# Patient Record
Sex: FEMALE | Race: BLACK OR AFRICAN AMERICAN | URBAN - METROPOLITAN AREA
[De-identification: names, ages, dates, MRNs, and addresses within clinical notes are randomized per-mention and may not be internally consistent; named-entity substitution may affect disease eponyms.]

---

## 2020-06-03 ENCOUNTER — OFFICE VISIT (OUTPATIENT)
Dept: URBAN - METROPOLITAN AREA CLINIC 97 | Facility: CLINIC | Age: 35
End: 2020-06-03
Payer: COMMERCIAL

## 2020-06-03 VITALS
TEMPERATURE: 97.4 F | WEIGHT: 170 LBS | SYSTOLIC BLOOD PRESSURE: 103 MMHG | HEART RATE: 77 BPM | DIASTOLIC BLOOD PRESSURE: 70 MMHG | HEIGHT: 65 IN | RESPIRATION RATE: 18 BRPM | BODY MASS INDEX: 28.32 KG/M2

## 2020-06-03 DIAGNOSIS — D50.8 OTHER IRON DEFICIENCY ANEMIA: ICD-10-CM

## 2020-06-03 PROCEDURE — 96365 THER/PROPH/DIAG IV INF INIT: CPT | Performed by: INTERNAL MEDICINE

## 2020-06-03 RX ORDER — SULFASALAZINE 500 MG/1
TAKE 9 TABLETS BY ORAL ROUTE DAILY FOR 90 DAYS TABLET ORAL 1
Qty: 810 | Refills: 4 | Status: ACTIVE | COMMUNITY
Start: 2019-12-09 | End: 2021-03-03

## 2020-06-24 ENCOUNTER — LAB OUTSIDE AN ENCOUNTER (OUTPATIENT)
Dept: URBAN - METROPOLITAN AREA TELEHEALTH 2 | Facility: TELEHEALTH | Age: 35
End: 2020-06-24

## 2020-06-24 ENCOUNTER — OFFICE VISIT (OUTPATIENT)
Dept: URBAN - METROPOLITAN AREA TELEHEALTH 2 | Facility: TELEHEALTH | Age: 35
End: 2020-06-24
Payer: COMMERCIAL

## 2020-06-24 DIAGNOSIS — Z12.11 COLON CANCER SCREENING: ICD-10-CM

## 2020-06-24 DIAGNOSIS — K92.1 BLOOD IN STOOL: ICD-10-CM

## 2020-06-24 DIAGNOSIS — R74.8 ABNORMAL LEVELS OF OTHER SERUM ENZYMES: ICD-10-CM

## 2020-06-24 DIAGNOSIS — K51.00 ULCERATIVE COLITIS, UNIVERSAL, WITHOUT COMPLICATIONS: ICD-10-CM

## 2020-06-24 PROCEDURE — 99214 OFFICE O/P EST MOD 30 MIN: CPT | Performed by: INTERNAL MEDICINE

## 2020-06-24 PROCEDURE — G9903 PT SCRN TBCO ID AS NON USER: HCPCS | Performed by: INTERNAL MEDICINE

## 2020-06-24 PROCEDURE — G8427 DOCREV CUR MEDS BY ELIG CLIN: HCPCS | Performed by: INTERNAL MEDICINE

## 2020-06-24 PROCEDURE — G9622 NO UNHEAL ETOH USER: HCPCS | Performed by: INTERNAL MEDICINE

## 2020-06-24 PROCEDURE — 3017F COLORECTAL CA SCREEN DOC REV: CPT | Performed by: INTERNAL MEDICINE

## 2020-06-24 PROCEDURE — G8420 CALC BMI NORM PARAMETERS: HCPCS | Performed by: INTERNAL MEDICINE

## 2020-06-24 PROCEDURE — G8482 FLU IMMUNIZE ORDER/ADMIN: HCPCS | Performed by: INTERNAL MEDICINE

## 2020-06-24 PROCEDURE — 1036F TOBACCO NON-USER: CPT | Performed by: INTERNAL MEDICINE

## 2020-06-24 RX ORDER — SULFASALAZINE 500 MG/1
TAKE 9 TABLETS BY ORAL ROUTE DAILY FOR 90 DAYS TABLET ORAL 1
OUTPATIENT
Start: 2019-12-09 | End: 2021-03-03

## 2020-06-24 RX ORDER — SODIUM, POTASSIUM,MAG SULFATES 17.5-3.13G
177 ML SOLUTION, RECONSTITUTED, ORAL ORAL ONCE
Qty: 1 | Refills: 0 | OUTPATIENT

## 2020-06-24 RX ORDER — SULFASALAZINE 500 MG/1
TAKE 9 TABLETS BY ORAL ROUTE DAILY FOR 90 DAYS TABLET ORAL 1
Qty: 810 | Refills: 4 | Status: ACTIVE | COMMUNITY
Start: 2019-12-09 | End: 2021-03-03

## 2020-06-24 NOTE — HPI-TODAY'S VISIT:
PT is a 35 y/o individual with h/o left sided UC, currently on sulfasalazine (6 tab), here for follow-up of her condition.  Patient is referred by Dr. Jenifer Diaz.  She was diagnosed with UC in 3/2017 with sxs of consistent diarrhea.  She was started on antibiotics, which did not work.  Stool studies were negative.  She had a colonoscopy and was found to have mild colitis.  Previously on 11/1/2018, she reports that she has variable BM s.  She sees blood several times per week.  She reports having anywhere from 2-3 BM s per day, loose in nature.  No abdominal pain. No urgency.  A colonoscopy with Dr Tubbs on 11/2018 revealed Lundberg Score 2 disease in distal colon, confirmed on biopsies with active moderate disease and mild disease proximally.  Previously on 11/14/2018, she is noticing some nausea after she got to about 8 tablets of sulfasalazine. She has seen almost complete reduction in blood, still loose, but starting to get formed. Some abdominal pain, but perhaps jitters prior to her trip to [place].  Previously on 2/8/2019, she reports that she feels that she is almost normal.  She has up to 3 BM, formed in nature, no blood in stool.  No abdominal pain.  Her trip to [place] was good and did not get pregnant.  She had flex sig 5/2019 which revealed normal colonoscopy, Lundberg Score 0 to 1 in rectum, with biopsies confirming no active disease.  Previously on 6/20/2019, she has up to 2BM per day, no blood in the stool.  She has occasional abdominal pain/cramping.  Some fatigue.  Previously on 8/8/2019, she reports that is pregnant.  She has up to 2 BM per day, slightly loose and green in color, sometimes in pebble appearance.  Has started on MVI by gynecologist.  She has some fatigue.  Signif nausea.  Previously on 9/23/2019 she reports that she has 1 BM 2 times per week.  No blood in the stool.  No abdominal pain.  Pregnancy is going well.  Has a lot of nausea and vomiting.   Previously on 10/23/2019, pt reports that she has been doing well.  N/V has resolved, nl BM.  Pregnancy is going well overall.  Has hemorrhoids that have become enlarged and swollen, no pain.  Having constipation, has not started miralax.  Saw blood in the stool.  Previously on 1/13/2020, pt reports that she is at 28 weeks of pregnancy.  Baby is doing well.  She has some issues with constipation every now and then, improves with Metamucil.  Nausea and vomiting has persists.  She was given Zofran by OB doctor.  She is taking her medications.  She is losing some weight and is recommended to take some ensure supplementation. . Previously on 3/2/2020, pt reports she had to deliver her baby 6 weeks early (32 weeks) due to poor growth of the baby.  She does report that the pregnancy was stressful for her and had a lot of nausea and heartburn and associated nausea and vomiting. She had poor weight gain as well.  Has 1 BM per day, no blood.  Does have some constipation. . Today on 6/24/2020, pt reports that she is doing well.  She got the iron infusion, 2 infusion.  Was found to have abnl lft with PCP.  She has occasional spots of blood and occasional mucus.  She sees this initially when the dose of sulfa was reduced from 9 to 6.  . No NSAIDs. . No joint pain, no rashes.  Weight fluctuates.  No eye pain or eye sxs.   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 Labs: OSH 6/2020: Ast 51, alt 61 1/2020: hgb 10.6, ast 14, alt 10 6/2019: Hgb 10.9, Ast 18, Alt 13, ESR 13 2/2019: Hgb 10.8, Ast 20, Alt 13, ESR 7 11/2018: Hgb: 12.7, Ast 15, Alt 10, Iron Sat 20, Ferritin 108, Vit D 18.3, Esr 44, B12 813; Crp 4.8, Hep B immune; TB-Gold neg  10/2018: Hgb 12.7, Ast 15, Alt 10, Iron Sat 20%, Ferritin 108, vit D 18.3, Esr 44, Crp 4.8 . Quant gold neg, Hep B immune . 3/2017:[facility] serology c/w UC . Colonoscopy 5/2019: The sigmoid colon and descending colon appeared normal.  Biopsies were taken with a cold forceps for histology.  The pathology specimen was placed into Bottle Number 1.  A diffuse area of mildly erythematous mucosa was found in the rectum.  This was biopsied with a cold forceps for histology. The pathology specimen was placed into Bottle Number 2.  A. Sigmoid Colon , Biopsy: -Colonic mucosa with no diagnostic abnormality.  -No evidence of chronic or active colitis.  -No granulomas seen.  -Negative for dysplasia or malignancy. B. Rectum , Biopsy: -Minimal architectural changes (see comment). -No evidence of active colitis. -No granulomas seen. -Negative for dysplasia or malignancy.   11/2018 with Dr. Tubbs Inflammation was found in a continuous and circumferential pattern from the rectum to the sigmoid colon.  This was graded as Lundberg Score 2 (moderate, with marked erythema, absent vascular pattern, friability, erosions).  Biopsies were taken with a cold forceps for histology.  The pathology specimen was placed into Bottle Number 3. A localized area of mildly erythematous mucosa was found in the ascending colon.  The pathology specimen was placed into Bottle Number 1.  A. Right Colon , Biopsy: -Architectural and inflammatory changes consistent with primary inflammatory bowel disease with minimal activity. -No evidence of dysplasia or malignancy. B. Random Colon , Biopsy: -No diagnostic abnormality. -No evidence of chronic or active colitis, including lymphocytic and collagenous colitis. -No evidence of dysplasia or malignancy. C. Rectum , Biopsy: -Architectural and inflammatory changes consistent with primary inflammatory bowel disease with moderate activity. -No evidence of dysplasia or malignancy.   2017: The terminal ileum appeared normal.  Biopsies were taken with a cold forceps for histology. A patchy area of moderately congested, erythematous, friable (with contact bleeding), granular, hemorrhagic, inflamed and ulcerated mucosa was found in the recto-sigmoid colon, in the sigmoid colon and in the cecum.  Biopsies were taken with a cold forceps for histology from cecum and rectosigmoid colon. The descending colon, transverse colon and ascending colon appeared normal.  Biopsies were taken with a cold forceps for histology and labeled right colon, left colon.  A. TERMINAL ILEUM (BIOPSY) ILEAL MUCOSA WITH NO SIGNIFICANT HISTOPATHOLOGIC CHANGES. Negative for ileitis. B. CECUM (BIOPSY) CHRONIC ACTIVE COLITIS. THESE CHANGES MAY BE SEEN WITH INFLAMMATORY BOWEL DISEASE OR A  PROLONGED SELF LIMITED COLITIS. Negative for granulomas and dysplasia. C. COLON (BIOPSY), RIGHT COLONIC MUCOSA WITH NO SIGNIFICANT HISTOPATHOLOGIC CHANGES. Negative for microscopic colitis. D. COLON (BIOPSY), LEFT COLONIC MUCOSA WITH NO SIGNIFICANT HISTOPATHOLOGIC CHANGES. Negative for microscopic colitis. E. RECTOSIGMOID (BIOPSY) CHRONIC ACTIVE COLITIS. THESE CHANGES MAY BE SEEN WITH INFLAMMATORY BOWEL DISEASE OR A  PROLONGED SELF LIMITED COLITIS. Negative for granulomas and dysplasia.

## 2020-07-13 ENCOUNTER — OFFICE VISIT (OUTPATIENT)
Dept: URBAN - METROPOLITAN AREA CLINIC 98 | Facility: CLINIC | Age: 35
End: 2020-07-13

## 2020-07-14 ENCOUNTER — TELEPHONE ENCOUNTER (OUTPATIENT)
Dept: URBAN - METROPOLITAN AREA CLINIC 92 | Facility: CLINIC | Age: 35
End: 2020-07-14

## 2020-07-14 RX ORDER — SODIUM, POTASSIUM,MAG SULFATES 17.5-3.13G
177 ML SOLUTION, RECONSTITUTED, ORAL ORAL ONCE
Qty: 1 | Refills: 0 | Status: ACTIVE | COMMUNITY

## 2020-07-14 RX ORDER — SULFASALAZINE 500 MG/1
TAKE 9 TABLETS BY ORAL ROUTE DAILY FOR 90 DAYS TABLET ORAL 1
Status: ACTIVE | COMMUNITY
Start: 2019-12-09 | End: 2021-03-03

## 2020-07-14 RX ORDER — SODIUM, POTASSIUM,MAG SULFATES 17.5-3.13G
TAKE 177 ML SOLUTION, RECONSTITUTED, ORAL ORAL
Qty: 177 ML | Refills: 0 | OUTPATIENT
Start: 2020-07-14 | End: 2020-07-15

## 2020-08-06 ENCOUNTER — OFFICE VISIT (OUTPATIENT)
Dept: URBAN - METROPOLITAN AREA SURGERY CENTER 18 | Facility: SURGERY CENTER | Age: 35
End: 2020-08-06
Payer: COMMERCIAL

## 2020-08-06 DIAGNOSIS — K63.5 BENIGN COLON POLYP: ICD-10-CM

## 2020-08-06 DIAGNOSIS — K51.00 CHRONIC PANCOLONIC ULCERATIVE COLITIS: ICD-10-CM

## 2020-08-06 PROCEDURE — 45380 COLONOSCOPY AND BIOPSY: CPT | Performed by: INTERNAL MEDICINE

## 2020-08-06 PROCEDURE — G9937 DIG OR SURV COLSCO: HCPCS | Performed by: INTERNAL MEDICINE

## 2020-08-06 PROCEDURE — G8907 PT DOC NO EVENTS ON DISCHARG: HCPCS | Performed by: INTERNAL MEDICINE

## 2020-08-06 RX ORDER — SODIUM, POTASSIUM,MAG SULFATES 17.5-3.13G
177 ML SOLUTION, RECONSTITUTED, ORAL ORAL ONCE
Qty: 1 | Refills: 0 | Status: ACTIVE | COMMUNITY

## 2020-08-06 RX ORDER — SULFASALAZINE 500 MG/1
TAKE 9 TABLETS BY ORAL ROUTE DAILY FOR 90 DAYS TABLET ORAL 1
Status: ACTIVE | COMMUNITY
Start: 2019-12-09 | End: 2021-03-03

## 2020-08-07 ENCOUNTER — TELEPHONE ENCOUNTER (OUTPATIENT)
Dept: URBAN - METROPOLITAN AREA CLINIC 96 | Facility: CLINIC | Age: 35
End: 2020-08-07

## 2020-08-17 ENCOUNTER — OFFICE VISIT (OUTPATIENT)
Dept: URBAN - METROPOLITAN AREA CLINIC 98 | Facility: CLINIC | Age: 35
End: 2020-08-17
Payer: COMMERCIAL

## 2020-08-17 DIAGNOSIS — R74.8 ABNORMAL AST AND ALT: ICD-10-CM

## 2020-08-17 DIAGNOSIS — K51.919 CHRONIC ULCERATIVE COLITIS: ICD-10-CM

## 2020-08-17 PROCEDURE — 86376 MICROSOMAL ANTIBODY EACH: CPT | Performed by: INTERNAL MEDICINE

## 2020-08-17 PROCEDURE — G8420 CALC BMI NORM PARAMETERS: HCPCS | Performed by: INTERNAL MEDICINE

## 2020-08-17 PROCEDURE — G9903 PT SCRN TBCO ID AS NON USER: HCPCS | Performed by: INTERNAL MEDICINE

## 2020-08-17 PROCEDURE — 99214 OFFICE O/P EST MOD 30 MIN: CPT | Performed by: INTERNAL MEDICINE

## 2020-08-17 PROCEDURE — G8427 DOCREV CUR MEDS BY ELIG CLIN: HCPCS | Performed by: INTERNAL MEDICINE

## 2020-08-17 RX ORDER — SULFASALAZINE 500 MG/1
TAKE 9 TABLETS BY ORAL ROUTE DAILY FOR 90 DAYS TABLET ORAL 1
Status: ACTIVE | COMMUNITY
Start: 2019-12-09 | End: 2021-03-03

## 2020-08-17 RX ORDER — PRENATAL VIT 49/IRON FUM/FOLIC 6.75-0.2MG
AS DIRECTED TABLET ORAL
Status: ACTIVE | COMMUNITY

## 2020-08-17 RX ORDER — SODIUM, POTASSIUM,MAG SULFATES 17.5-3.13G
177 ML SOLUTION, RECONSTITUTED, ORAL ORAL ONCE
Qty: 1 | Refills: 0 | Status: ON HOLD | COMMUNITY

## 2020-08-17 RX ORDER — CHOLECALCIFEROL (VITAMIN D3) 50 MCG
1 TABLET TABLET ORAL ONCE A DAY
Status: ACTIVE | COMMUNITY

## 2020-08-17 NOTE — HPI-TODAY'S VISIT:
Here on referral for elevated liver tests.  Recently found on labs  sees Dr Tubbs for UC  @ pregnancy - lost 20 lbs during pregnancy  threw up all through pregnancy  fetal placenta w poor blood flow - he was always small  . had baby (premie) 2/21  had iron infusions may (early); 6/3 . labs 6/2020: alt 61 ast 51 alp 115  7/2020 alt 63 ast 45 alp 128 tbili 0.2 . now back at normal weight  breastfeeding which is going well . vit D x 1 month bought online - started after liver tests started rising collagen : started after baby

## 2020-08-24 ENCOUNTER — OFFICE VISIT (OUTPATIENT)
Dept: URBAN - METROPOLITAN AREA TELEHEALTH 2 | Facility: TELEHEALTH | Age: 35
End: 2020-08-24
Payer: COMMERCIAL

## 2020-08-24 DIAGNOSIS — Z09 FOLLOW UP: ICD-10-CM

## 2020-08-24 DIAGNOSIS — K92.1 BLOOD IN STOOL: ICD-10-CM

## 2020-08-24 DIAGNOSIS — R94.5 ABNORMAL LFTS: ICD-10-CM

## 2020-08-24 DIAGNOSIS — K51.00 ULCERATIVE COLITIS, UNIVERSAL, WITHOUT COMPLICATIONS: ICD-10-CM

## 2020-08-24 PROCEDURE — G9903 PT SCRN TBCO ID AS NON USER: HCPCS | Performed by: INTERNAL MEDICINE

## 2020-08-24 PROCEDURE — G9622 NO UNHEAL ETOH USER: HCPCS | Performed by: INTERNAL MEDICINE

## 2020-08-24 PROCEDURE — G8420 CALC BMI NORM PARAMETERS: HCPCS | Performed by: INTERNAL MEDICINE

## 2020-08-24 PROCEDURE — 1036F TOBACCO NON-USER: CPT | Performed by: INTERNAL MEDICINE

## 2020-08-24 PROCEDURE — 99213 OFFICE O/P EST LOW 20 MIN: CPT | Performed by: INTERNAL MEDICINE

## 2020-08-24 PROCEDURE — G8427 DOCREV CUR MEDS BY ELIG CLIN: HCPCS | Performed by: INTERNAL MEDICINE

## 2020-08-24 PROCEDURE — 3017F COLORECTAL CA SCREEN DOC REV: CPT | Performed by: INTERNAL MEDICINE

## 2020-08-24 PROCEDURE — G8482 FLU IMMUNIZE ORDER/ADMIN: HCPCS | Performed by: INTERNAL MEDICINE

## 2020-08-24 RX ORDER — PRENATAL VIT 49/IRON FUM/FOLIC 6.75-0.2MG
AS DIRECTED TABLET ORAL
Status: ACTIVE | COMMUNITY

## 2020-08-24 RX ORDER — SODIUM, POTASSIUM,MAG SULFATES 17.5-3.13G
177 ML SOLUTION, RECONSTITUTED, ORAL ORAL ONCE
Qty: 1 | Refills: 0 | OUTPATIENT

## 2020-08-24 RX ORDER — SULFASALAZINE 500 MG/1
TAKE 9 TABLETS BY ORAL ROUTE DAILY FOR 90 DAYS TABLET ORAL 1
Status: ACTIVE | COMMUNITY
Start: 2019-12-09 | End: 2021-03-03

## 2020-08-24 RX ORDER — SODIUM, POTASSIUM,MAG SULFATES 17.5-3.13G
177 ML SOLUTION, RECONSTITUTED, ORAL ORAL ONCE
Qty: 1 | Refills: 0 | Status: ON HOLD | COMMUNITY

## 2020-08-24 RX ORDER — CHOLECALCIFEROL (VITAMIN D3) 50 MCG
1 TABLET TABLET ORAL ONCE A DAY
Status: ACTIVE | COMMUNITY

## 2020-08-24 NOTE — HPI-TODAY'S VISIT:
Ms Mackey is a 33 y/o individual with h/o left sided UC, currently on sulfasalazine (9 tab), here for follow-up of her condition. . Patient is referred by Dr. Jenifer Diaz. . She was diagnosed with UC in 3/2017 with sxs of consistent diarrhea.  She was started on antibiotics, which did not work.  Stool studies were negative.  She had a colonoscopy and was found to have mild colitis. . Previously on 11/1/2018, she reports that she has variable BM s.  She sees blood several times per week.  She reports having anywhere from 2-3 BM s per day, loose in nature.  No abdominal pain. No urgency. . A colonoscopy with Dr Tubbs on 11/2018 revealed Lundberg Score 2 disease in distal colon, confirmed on biopsies with active moderate disease and mild disease proximally. . Previously on 11/14/2018, she is noticing some nausea after she got to about 8 tablets of sulfasalazine. She has seen almost complete reduction in blood, still loose, but starting to get formed. Some abdominal pain, but perhaps jitters prior to her trip to [place]. . Previously on 2/8/2019, she reports that she feels that she is almost normal.  She has up to 3 BM, formed in nature, no blood in stool.  No abdominal pain.  Her trip to [facility] was good and did not get pregnant. . She had flex sig 5/2019 which revealed normal colonoscopy, Lundberg Score 0 to 1 in rectum, with biopsies confirming no active disease. . Previously on 6/20/2019, she has up to 2BM per day, no blood in the stool.  She has occasional abdominal pain/cramping.  Some fatigue.  Previously on 8/8/2019, she reports that is pregnant.  She has up to 2 BM per day, slightly loose and green in color, sometimes in pebble appearance.  Has started on MVI by gynecologist.  She has some fatigue.  Signif nausea. . Previously on 9/23/2019 she reports that she has 1 BM 2 times per week.  No blood in the stool.  No abdominal pain.  Pregnancy is going well.  Has a lot of nausea and vomiting.  . Previously on 10/23/2019, pt reports that she has been doing well.  N/V has resolved, nl BM.  Pregnancy is going well overall.  Has hemorrhoids that have become enlarged and swollen, no pain.  Having constipation, has not started miralax.  Saw blood in the stool. . Previously on 1/13/2020, pt reports that she is at 28 weeks of pregnancy.  Baby is doing well.  She has some issues with constipation every now and then, improves with Metamucil.  Nausea and vomiting has persists.  She was given Zofran by OB doctor.  She is taking her medications.  She is losing some weight and is recommended to take some ensure supplementation. . Previously on 3/2/2020, pt reports she had to deliver her baby 6 weeks early (32 weeks) due to poor growth of the baby.  She does report that the pregnancy was stressful for her and had a lot of nausea and heartburn and associated nausea and vomiting. She had poor weight gain as well.  Has 1 BM per day, no blood.  Does have some constipation. . Previously on 6/24/2020, pt reports that she is doing well.  She got the iron infusion, 2 infusion.  Was found to have abnl lft with PCP.  She has occasional spots of blood and occasional mucus.  She sees this initially when the dose of sulfa was reduced from 9 to 6.  . Today on 8/24/2020, pt reports that her colonoscopy was completely normal (on 8/2020), with no active disease, with pathology normal.  . No NSAIDs. . No joint pain, no rashes.  Weight fluctuates.  No eye pain or eye sxs.  . 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 Labs: OSH 6/2020: Ast 51, alt 61 1/2020: hgb 10.6, ast 14, alt 10 6/2019: Hgb 10.9, Ast 18, Alt 13, ESR 13 2/2019: Hgb 10.8, Ast 20, Alt 13, ESR 7 11/2018: Hgb: 12.7, Ast 15, Alt 10, Iron Sat 20, Ferritin 108, Vit D 18.3, Esr 44, B12 813; Crp 4.8, Hep B immune; TB-Gold neg  10/2018: Hgb 12.7, Ast 15, Alt 10, Iron Sat 20%, Ferritin 108, vit D 18.3, Esr 44, Crp 4.8 . Quant gold neg, Hep B immune . 3/2017:[facility] serology c/w UC . Colonoscopy 6/2020: The transverse colon, hepatic flexure, ascending colon and cecum appeared normal. Biopsies were taken with a cold forceps for histology. The pathology specimen was placed into Bottle Number 1. Lundberg Score 0. Findings: A 4 mm polyp was found in the descending colon. The polyp was sessile. The polyp was removed with a jumbo cold forceps. Resection and retrieval were complete. The pathology specimen was placed into Bottle Number 3. Lundberg Score 0. The sigmoid colon and descending colon appeared normal. Biopsies were taken with a cold forceps for histology. The pathology specimen was placed into Bottle Number 2. Lundberg Score 0. A localized area of mildly erythematous mucosa was found in the rectum. This was biopsied with a cold forceps for histology. The pathology specimen was placed into Bottle Number 4. Lundberg Score 1. . A Colon, Transverse / Right, Biopsy Colonic mucosa with no significant histopathology. No histologic evidence of active, chronic or microscopic colitis. No granulomata or dysplasia seen. B Colon, Descending, Polypectomy Hyperplastic polyp. No granulomata or dysplasia seen. C Colon, Left, Biopsy Colonic mucosa with no significant histopathology. No histologic evidence of active, chronic or microscopic colitis. No granulomata or dysplasia seen. D Rectum, Biopsy Colonic mucosa with no significant histopathology. No histologic evidence of active, chronic or microscopic colitis. No granulomata or dysplasia seen. . 5/2019: The sigmoid colon and descending colon appeared normal.  Biopsies were taken with a cold forceps for histology.  The pathology specimen was placed into Bottle Number 1.  A diffuse area of mildly erythematous mucosa was found in the rectum.  This was biopsied with a cold forceps for histology. The pathology specimen was placed into Bottle Number 2. . A. Sigmoid Colon , Biopsy: -Colonic mucosa with no diagnostic abnormality.  -No evidence of chronic or active colitis.  -No granulomas seen.  -Negative for dysplasia or malignancy. B. Rectum , Biopsy: -Minimal architectural changes (see comment). -No evidence of active colitis. -No granulomas seen. -Negative for dysplasia or malignancy.  . 11/2018 with Dr. Tubbs Inflammation was found in a continuous and circumferential pattern from the rectum to the sigmoid colon.  This was graded as Lundberg Score 2 (moderate, with marked erythema, absent vascular pattern, friability, erosions).  Biopsies were taken with a cold forceps for histology.  The pathology specimen was placed into Bottle Number 3. A localized area of mildly erythematous mucosa was found in the ascending colon.  The pathology specimen was placed into Bottle Number 1. . A. Right Colon , Biopsy: -Architectural and inflammatory changes consistent with primary inflammatory bowel disease with minimal activity. -No evidence of dysplasia or malignancy. B. Random Colon , Biopsy: -No diagnostic abnormality. -No evidence of chronic or active colitis, including lymphocytic and collagenous colitis. -No evidence of dysplasia or malignancy. C. Rectum , Biopsy: -Architectural and inflammatory changes consistent with primary inflammatory bowel disease with moderate activity. -No evidence of dysplasia or malignancy. . 2017: The terminal ileum appeared normal.  Biopsies were taken with a cold forceps for histology. A patchy area of moderately congested, erythematous, friable (with contact bleeding), granular, hemorrhagic, inflamed and ulcerated mucosa was found in the recto-sigmoid colon, in the sigmoid colon and in the cecum.  Biopsies were taken with a cold forceps for histology from cecum and rectosigmoid colon. The descending colon, transverse colon and ascending colon appeared normal.  Biopsies were taken with a cold forceps for histology and labeled right colon, left colon. . A. TERMINAL ILEUM (BIOPSY) ILEAL MUCOSA WITH NO SIGNIFICANT HISTOPATHOLOGIC CHANGES. Negative for ileitis. B. CECUM (BIOPSY) CHRONIC ACTIVE COLITIS. THESE CHANGES MAY BE SEEN WITH INFLAMMATORY BOWEL DISEASE OR A  PROLONGED SELF LIMITED COLITIS. Negative for granulomas and dysplasia. C. COLON (BIOPSY), RIGHT COLONIC MUCOSA WITH NO SIGNIFICANT HISTOPATHOLOGIC CHANGES. Negative for microscopic colitis. D. COLON (BIOPSY), LEFT COLONIC MUCOSA WITH NO SIGNIFICANT HISTOPATHOLOGIC CHANGES. Negative for microscopic colitis. E. RECTOSIGMOID (BIOPSY) CHRONIC ACTIVE COLITIS. THESE CHANGES MAY BE SEEN WITH INFLAMMATORY BOWEL DISEASE OR A  PROLONGED SELF LIMITED COLITIS. Negative for granulomas and dysplasia. .

## 2020-08-26 ENCOUNTER — TELEPHONE ENCOUNTER (OUTPATIENT)
Dept: URBAN - METROPOLITAN AREA CLINIC 98 | Facility: CLINIC | Age: 35
End: 2020-08-26

## 2020-08-28 LAB
A/G RATIO: 2.1
A1A RFX TO PHENOTYPE: (no result)
AAT, DNA ANALYSIS: (no result)
ACTIN (SMOOTH MUSCLE) ANTIBODY: 10
ADDITIONAL INFORMATION:: (no result)
ALBUMIN: 4.9
ALKALINE PHOSPHATASE: 106
ALPHA-1-ANTITRYPSIN, SERUM: 125
ALT (SGPT): 32
ANTI-CENTROMERE B ANTIBODIES: <0.2
ANTI-DNA (DS) AB QN: 1
ANTI-JO-1: <0.2
ANTICHROMATIN ANTIBODIES: <0.2
ANTISCLERODERMA-70 ANTIBODIES: <0.2
AST (SGOT): 31
BASO (ABSOLUTE): 0
BASOS: 0
BILIRUBIN, TOTAL: <0.2
BUN/CREATININE RATIO: 27
BUN: 20
CALCIUM: 9.6
CARBON DIOXIDE, TOTAL: 22
CERULOPLASMIN: 30
CHLORIDE: 103
COMMENT:: (no result)
CREATININE: 0.74
EGFR IF AFRICN AM: 121
EGFR IF NONAFRICN AM: 105
EOS (ABSOLUTE): 0.6
EOS: 10
FERRITIN, SERUM: 636
GLOBULIN, TOTAL: 2.3
GLUCOSE: 91
HBSAG SCREEN: NEGATIVE
HCV AB: <0.1
HEMATOCRIT: 36.8
HEMATOLOGY COMMENTS:: (no result)
HEMOGLOBIN: 11.2
HEP A AB, IGM: NEGATIVE
HEP A AB, TOTAL: POSITIVE
HEPATITIS B SURF AB QUANT: 206.6
IMMATURE CELLS: (no result)
IMMATURE GRANS (ABS): 0
IMMATURE GRANULOCYTES: 0
IMMUNOGLOBULIN A, QN, SERUM: 190
IMMUNOGLOBULIN G, QN, SERUM: 1143
IMMUNOGLOBULIN M, QN, SERUM: 115
IRON BIND.CAP.(TIBC): 244
IRON SATURATION: 27
IRON: 66
LIVER-KIDNEY MICROSOMAL AB: 0.9
LYMPHS (ABSOLUTE): 2.2
LYMPHS: 42
Lab: (no result)
Lab: (no result)
MCH: 26.6
MCHC: 30.4
MCV: 87
MITOCHONDRIAL (M2) ANTIBODY: <20
MONOCYTES(ABSOLUTE): 0.5
MONOCYTES: 9
NEUTROPHILS (ABSOLUTE): 2.1
NEUTROPHILS: 39
NRBC: (no result)
PLATELETS: 249
POTASSIUM: 4
PROTEIN, TOTAL: 7.2
RBC: 4.21
RDW: 12.2
RNP ANTIBODIES: <0.2
SJOGREN'S ANTI-SS-A: <0.2
SJOGREN'S ANTI-SS-B: <0.2
SMITH ANTIBODIES: <0.2
SODIUM: 145
UIBC: 178
WBC: 5.4

## 2021-03-08 ENCOUNTER — ERX REFILL RESPONSE (OUTPATIENT)
Dept: URBAN - METROPOLITAN AREA CLINIC 13 | Facility: CLINIC | Age: 36
End: 2021-03-08

## 2021-03-08 RX ORDER — SULFASALAZINE 500 MG/1
TAKE 9 TABLETS BY ORAL ROUTE DAILY FOR 90 DAYS TABLET ORAL
Qty: 810 | Refills: 2

## 2021-05-17 ENCOUNTER — WEB ENCOUNTER (OUTPATIENT)
Dept: URBAN - METROPOLITAN AREA CLINIC 96 | Facility: CLINIC | Age: 36
End: 2021-05-17

## 2021-05-17 RX ORDER — SULFASALAZINE 500 MG/1
TAKE 9 TABLETS BY ORAL ROUTE DAILY FOR 90 DAYS TABLET ORAL
Qty: 810 | Refills: 4
End: 2022-08-11

## 2021-12-14 ENCOUNTER — OFFICE VISIT (OUTPATIENT)
Dept: URBAN - METROPOLITAN AREA CLINIC 96 | Facility: CLINIC | Age: 36
End: 2021-12-14

## 2022-04-14 ENCOUNTER — WEB ENCOUNTER (OUTPATIENT)
Dept: URBAN - METROPOLITAN AREA CLINIC 96 | Facility: CLINIC | Age: 37
End: 2022-04-14

## 2022-04-20 ENCOUNTER — LAB OUTSIDE AN ENCOUNTER (OUTPATIENT)
Dept: URBAN - METROPOLITAN AREA CLINIC 96 | Facility: CLINIC | Age: 37
End: 2022-04-20

## 2022-04-20 ENCOUNTER — OFFICE VISIT (OUTPATIENT)
Dept: URBAN - METROPOLITAN AREA CLINIC 96 | Facility: CLINIC | Age: 37
End: 2022-04-20
Payer: COMMERCIAL

## 2022-04-20 DIAGNOSIS — R94.5 ABNORMAL LFTS: ICD-10-CM

## 2022-04-20 DIAGNOSIS — K51.00 ULCERATIVE COLITIS, UNIVERSAL, WITHOUT COMPLICATIONS: ICD-10-CM

## 2022-04-20 DIAGNOSIS — R11.2 NAUSEA: ICD-10-CM

## 2022-04-20 DIAGNOSIS — Z91.89 COLON CANCER HIGH RISK: ICD-10-CM

## 2022-04-20 DIAGNOSIS — K92.1 BLOOD IN STOOL: ICD-10-CM

## 2022-04-20 DIAGNOSIS — Z09 FOLLOW UP: ICD-10-CM

## 2022-04-20 PROCEDURE — 99214 OFFICE O/P EST MOD 30 MIN: CPT | Performed by: INTERNAL MEDICINE

## 2022-04-20 RX ORDER — SULFASALAZINE 500 MG/1
TAKE 9 TABLETS BY ORAL ROUTE DAILY FOR 90 DAYS TABLET ORAL
Qty: 810 | Refills: 4 | Status: ACTIVE | COMMUNITY
End: 2022-08-11

## 2022-04-20 RX ORDER — SULFASALAZINE 500 MG/1
9 TABLET TABLET ORAL ONCE A DAY
Qty: 270 TABLET | Refills: 4 | OUTPATIENT
Start: 2022-04-20 | End: 2022-09-17

## 2022-04-20 RX ORDER — PRENATAL VIT 49/IRON FUM/FOLIC 6.75-0.2MG
AS DIRECTED TABLET ORAL
Status: ACTIVE | COMMUNITY

## 2022-04-20 RX ORDER — SODIUM, POTASSIUM,MAG SULFATES 17.5-3.13G
177 ML SOLUTION, RECONSTITUTED, ORAL ORAL ONCE
Qty: 1 | Refills: 0 | OUTPATIENT

## 2022-04-20 RX ORDER — CHOLECALCIFEROL (VITAMIN D3) 50 MCG
1 TABLET TABLET ORAL ONCE A DAY
Status: ACTIVE | COMMUNITY

## 2022-04-20 RX ORDER — SODIUM, POTASSIUM,MAG SULFATES 17.5-3.13G
177 ML SOLUTION, RECONSTITUTED, ORAL ORAL ONCE
Qty: 1 | Refills: 0 | Status: ACTIVE | COMMUNITY

## 2022-04-20 NOTE — HPI-TODAY'S VISIT:
Ms Mackey is a 36 y/o individual with h/o left sided UC, currently on sulfasalazine (9 tab), here for follow-up of her condition. . Patient is referred by Dr. Jenifer Diaz. . She was diagnosed with UC in 3/2017 with sxs of consistent diarrhea.  She was started on antibiotics, which did not work.  Stool studies were negative.  She had a colonoscopy and was found to have mild colitis. . Previously on 11/1/2018, she reports that she has variable BM s.  She sees blood several times per week.  She reports having anywhere from 2-3 BM s per day, loose in nature.  No abdominal pain. No urgency. . A colonoscopy with Dr Tubbs on 11/2018 revealed Lundberg Score 2 disease in distal colon, confirmed on biopsies with active moderate disease and mild disease proximally. . Previously on 11/14/2018, she is noticing some nausea after she got to about 8 tablets of sulfasalazine. She has seen almost complete reduction in blood, still loose, but starting to get formed. Some abdominal pain, but perhaps jitters prior to her trip to [place]. . Previously on 2/8/2019, she reports that she feels that she is almost normal.  She has up to 3 BM, formed in nature, no blood in stool.  No abdominal pain.  Her trip to [facility] was good and did not get pregnant. . She had flex sig 5/2019 which revealed normal colonoscopy, Lundberg Score 0 to 1 in rectum, with biopsies confirming no active disease. . Previously on 6/20/2019, she has up to 2BM per day, no blood in the stool.  She has occasional abdominal pain/cramping.  Some fatigue.  Previously on 8/8/2019, she reports that is pregnant.  She has up to 2 BM per day, slightly loose and green in color, sometimes in pebble appearance.  Has started on MVI by gynecologist.  She has some fatigue.  Signif nausea. . Previously on 9/23/2019 she reports that she has 1 BM 2 times per week.  No blood in the stool.  No abdominal pain.  Pregnancy is going well.  Has a lot of nausea and vomiting.  . Previously on 10/23/2019, pt reports that she has been doing well.  N/V has resolved, nl BM.  Pregnancy is going well overall.  Has hemorrhoids that have become enlarged and swollen, no pain.  Having constipation, has not started miralax.  Saw blood in the stool. . Previously on 1/13/2020, pt reports that she is at 28 weeks of pregnancy.  Baby is doing well.  She has some issues with constipation every now and then, improves with Metamucil.  Nausea and vomiting has persists.  She was given Zofran by OB doctor.  She is taking her medications.  She is losing some weight and is recommended to take some ensure supplementation. . Previously on 3/2/2020, pt reports she had to deliver her baby 6 weeks early (32 weeks) due to poor growth of the baby.  She does report that the pregnancy was stressful for her and had a lot of nausea and heartburn and associated nausea and vomiting. She had poor weight gain as well.  Has 1 BM per day, no blood.  Does have some constipation. . Previously on 6/24/2020, pt reports that she is doing well.  She got the iron infusion, 2 infusion.  Was found to have abnl lft with PCP.  She has occasional spots of blood and occasional mucus.  She sees this initially when the dose of sulfa was reduced from 9 to 6.  . Previously on 8/24/2020, pt reports that her colonoscopy was completely normal (on 8/2020), with no active disease, with pathology normal. . Today on 4/20/2022, she is doing very well.  No issues with diarrhea, rectal bleeding, no abd pain.  Considering another baby this year. . No NSAIDs. . No joint pain, no rashes.  Weight fluctuates.  No eye pain or eye sxs. . 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 Labs: OSH 6/2020: Ast 51, alt 61 1/2020: hgb 10.6, ast 14, alt 10 6/2019: Hgb 10.9, Ast 18, Alt 13, ESR 13 2/2019: Hgb 10.8, Ast 20, Alt 13, ESR 7 11/2018: Hgb: 12.7, Ast 15, Alt 10, Iron Sat 20, Ferritin 108, Vit D 18.3, Esr 44, B12 813; Crp 4.8, Hep B immune; TB-Gold neg  10/2018: Hgb 12.7, Ast 15, Alt 10, Iron Sat 20%, Ferritin 108, vit D 18.3, Esr 44, Crp 4.8 . Quant gold neg, Hep B immune . 3/2017:[facility] serology c/w UC . Colonoscopy 6/2020: The transverse colon, hepatic flexure, ascending colon and cecum appeared normal. Biopsies were taken with a cold forceps for histology. The pathology specimen was placed into Bottle Number 1. Lundberg Score 0. Findings: A 4 mm polyp was found in the descending colon. The polyp was sessile. The polyp was removed with a jumbo cold forceps. Resection and retrieval were complete. The pathology specimen was placed into Bottle Number 3. Lundberg Score 0. The sigmoid colon and descending colon appeared normal. Biopsies were taken with a cold forceps for histology. The pathology specimen was placed into Bottle Number 2. Lundberg Score 0. A localized area of mildly erythematous mucosa was found in the rectum. This was biopsied with a cold forceps for histology. The pathology specimen was placed into Bottle Number 4. Lundberg Score 1. . A Colon, Transverse / Right, Biopsy Colonic mucosa with no significant histopathology. No histologic evidence of active, chronic or microscopic colitis. No granulomata or dysplasia seen. B Colon, Descending, Polypectomy Hyperplastic polyp. No granulomata or dysplasia seen. C Colon, Left, Biopsy Colonic mucosa with no significant histopathology. No histologic evidence of active, chronic or microscopic colitis. No granulomata or dysplasia seen. D Rectum, Biopsy Colonic mucosa with no significant histopathology. No histologic evidence of active, chronic or microscopic colitis. No granulomata or dysplasia seen. . 5/2019: The sigmoid colon and descending colon appeared normal.  Biopsies were taken with a cold forceps for histology.  The pathology specimen was placed into Bottle Number 1.  A diffuse area of mildly erythematous mucosa was found in the rectum.  This was biopsied with a cold forceps for histology. The pathology specimen was placed into Bottle Number 2. . A. Sigmoid Colon , Biopsy: -Colonic mucosa with no diagnostic abnormality.  -No evidence of chronic or active colitis.  -No granulomas seen.  -Negative for dysplasia or malignancy. B. Rectum , Biopsy: -Minimal architectural changes (see comment). -No evidence of active colitis. -No granulomas seen. -Negative for dysplasia or malignancy. . 11/2018 with Dr. Tubbs Inflammation was found in a continuous and circumferential pattern from the rectum to the sigmoid colon.  This was graded as Lundberg Score 2 (moderate, with marked erythema, absent vascular pattern, friability, erosions).  Biopsies were taken with a cold forceps for histology.  The pathology specimen was placed into Bottle Number 3. A localized area of mildly erythematous mucosa was found in the ascending colon.  The pathology specimen was placed into Bottle Number 1. . A. Right Colon , Biopsy: -Architectural and inflammatory changes consistent with primary inflammatory bowel disease with minimal activity. -No evidence of dysplasia or malignancy. B. Random Colon , Biopsy: -No diagnostic abnormality. -No evidence of chronic or active colitis, including lymphocytic and collagenous colitis. -No evidence of dysplasia or malignancy. C. Rectum , Biopsy: -Architectural and inflammatory changes consistent with primary inflammatory bowel disease with moderate activity. -No evidence of dysplasia or malignancy. . 2017: The terminal ileum appeared normal.  Biopsies were taken with a cold forceps for histology. A patchy area of moderately congested, erythematous, friable (with contact bleeding), granular, hemorrhagic, inflamed and ulcerated mucosa was found in the recto-sigmoid colon, in the sigmoid colon and in the cecum.  Biopsies were taken with a cold forceps for histology from cecum and rectosigmoid colon. The descending colon, transverse colon and ascending colon appeared normal.  Biopsies were taken with a cold forceps for histology and labeled right colon, left colon. . A. TERMINAL ILEUM (BIOPSY) ILEAL MUCOSA WITH NO SIGNIFICANT HISTOPATHOLOGIC CHANGES. Negative for ileitis. B. CECUM (BIOPSY) CHRONIC ACTIVE COLITIS. THESE CHANGES MAY BE SEEN WITH INFLAMMATORY BOWEL DISEASE OR A  PROLONGED SELF LIMITED COLITIS. Negative for granulomas and dysplasia. C. COLON (BIOPSY), RIGHT COLONIC MUCOSA WITH NO SIGNIFICANT HISTOPATHOLOGIC CHANGES. Negative for microscopic colitis. D. COLON (BIOPSY), LEFT COLONIC MUCOSA WITH NO SIGNIFICANT HISTOPATHOLOGIC CHANGES. Negative for microscopic colitis. E. RECTOSIGMOID (BIOPSY) CHRONIC ACTIVE COLITIS. THESE CHANGES MAY BE SEEN WITH INFLAMMATORY BOWEL DISEASE OR A  PROLONGED SELF LIMITED COLITIS. Negative for granulomas and dysplasia. .

## 2022-04-21 LAB
A/G RATIO: 1.8
ALBUMIN: 4.6
ALKALINE PHOSPHATASE: 77
ALT (SGPT): 16
AST (SGOT): 14
BASO (ABSOLUTE): 0
BASOS: 1
BILIRUBIN, TOTAL: <0.2
BUN/CREATININE RATIO: 15
BUN: 12
CALCIUM: 9.5
CARBON DIOXIDE, TOTAL: 19
CHLORIDE: 103
CREATININE: 0.8
EGFR: 97
EOS (ABSOLUTE): 0.4
EOS: 6
FERRITIN, SERUM: 312
GLOBULIN, TOTAL: 2.6
GLUCOSE: 90
HEMATOCRIT: 38
HEMATOLOGY COMMENTS:: (no result)
HEMOGLOBIN: 12.1
IMMATURE CELLS: (no result)
IMMATURE GRANS (ABS): 0
IMMATURE GRANULOCYTES: 0
IRON BIND.CAP.(TIBC): 279
IRON SATURATION: 16
IRON: 44
LYMPHS (ABSOLUTE): 1.9
LYMPHS: 30
MCH: 25.7
MCHC: 31.8
MCV: 81
MONOCYTES(ABSOLUTE): 0.6
MONOCYTES: 9
NEUTROPHILS (ABSOLUTE): 3.5
NEUTROPHILS: 54
NRBC: (no result)
PLATELETS: 313
POTASSIUM: 4.5
PROTEIN, TOTAL: 7.2
RBC: 4.71
RDW: 13.6
SODIUM: 140
UIBC: 235
VITAMIN B12: 459
VITAMIN D, 25-HYDROXY: 35.3
WBC: 6.4

## 2022-05-10 ENCOUNTER — TELEPHONE ENCOUNTER (OUTPATIENT)
Dept: URBAN - METROPOLITAN AREA CLINIC 92 | Facility: CLINIC | Age: 37
End: 2022-05-10

## 2022-05-10 RX ORDER — SODIUM, POTASSIUM,MAG SULFATES 17.5-3.13G
177 ML SOLUTION, RECONSTITUTED, ORAL ORAL ONCE
Qty: 1 | Refills: 0
End: 2022-05-11

## 2022-05-15 ENCOUNTER — WEB ENCOUNTER (OUTPATIENT)
Dept: URBAN - METROPOLITAN AREA CLINIC 96 | Facility: CLINIC | Age: 37
End: 2022-05-15

## 2022-05-16 ENCOUNTER — WEB ENCOUNTER (OUTPATIENT)
Dept: URBAN - METROPOLITAN AREA CLINIC 96 | Facility: CLINIC | Age: 37
End: 2022-05-16

## 2022-06-06 ENCOUNTER — OFFICE VISIT (OUTPATIENT)
Dept: URBAN - METROPOLITAN AREA SURGERY CENTER 18 | Facility: SURGERY CENTER | Age: 37
End: 2022-06-06
Payer: COMMERCIAL

## 2022-06-06 DIAGNOSIS — K51.50 CHRONIC LEFT-SIDED ULCERATIVE COLITIS: ICD-10-CM

## 2022-06-06 DIAGNOSIS — K31.89 ACQUIRED DEFORMITY OF DUODENUM: ICD-10-CM

## 2022-06-06 DIAGNOSIS — R12 BURNING REFLUX: ICD-10-CM

## 2022-06-06 PROCEDURE — 45380 COLONOSCOPY AND BIOPSY: CPT | Performed by: INTERNAL MEDICINE

## 2022-06-06 PROCEDURE — G8907 PT DOC NO EVENTS ON DISCHARG: HCPCS | Performed by: INTERNAL MEDICINE

## 2022-06-06 PROCEDURE — 43239 EGD BIOPSY SINGLE/MULTIPLE: CPT | Performed by: INTERNAL MEDICINE

## 2022-06-06 RX ORDER — SULFASALAZINE 500 MG/1
TAKE 9 TABLETS BY ORAL ROUTE DAILY FOR 90 DAYS TABLET ORAL
Qty: 810 | Refills: 4 | Status: ACTIVE | COMMUNITY
End: 2022-08-11

## 2022-06-06 RX ORDER — CHOLECALCIFEROL (VITAMIN D3) 50 MCG
1 TABLET TABLET ORAL ONCE A DAY
Status: ACTIVE | COMMUNITY

## 2022-06-06 RX ORDER — SULFASALAZINE 500 MG/1
9 TABLET TABLET ORAL ONCE A DAY
Qty: 270 TABLET | Refills: 4 | Status: ACTIVE | COMMUNITY
Start: 2022-04-20 | End: 2022-09-17

## 2022-06-06 RX ORDER — PRENATAL VIT 49/IRON FUM/FOLIC 6.75-0.2MG
AS DIRECTED TABLET ORAL
Status: ACTIVE | COMMUNITY

## 2022-07-23 ENCOUNTER — WEB ENCOUNTER (OUTPATIENT)
Dept: URBAN - METROPOLITAN AREA CLINIC 96 | Facility: CLINIC | Age: 37
End: 2022-07-23

## 2022-08-03 ENCOUNTER — OFFICE VISIT (OUTPATIENT)
Dept: URBAN - METROPOLITAN AREA CLINIC 96 | Facility: CLINIC | Age: 37
End: 2022-08-03

## 2022-08-31 ENCOUNTER — CLAIMS CREATED FROM THE CLAIM WINDOW (OUTPATIENT)
Dept: URBAN - METROPOLITAN AREA CLINIC 96 | Facility: CLINIC | Age: 37
End: 2022-08-31
Payer: COMMERCIAL

## 2022-08-31 ENCOUNTER — WEB ENCOUNTER (OUTPATIENT)
Dept: URBAN - METROPOLITAN AREA CLINIC 96 | Facility: CLINIC | Age: 37
End: 2022-08-31

## 2022-08-31 VITALS
SYSTOLIC BLOOD PRESSURE: 124 MMHG | BODY MASS INDEX: 36.99 KG/M2 | HEIGHT: 65 IN | WEIGHT: 222 LBS | DIASTOLIC BLOOD PRESSURE: 83 MMHG | HEART RATE: 73 BPM | TEMPERATURE: 98.4 F

## 2022-08-31 DIAGNOSIS — K92.1 BLOOD IN STOOL: ICD-10-CM

## 2022-08-31 DIAGNOSIS — K51.00 ULCERATIVE COLITIS, UNIVERSAL, WITHOUT COMPLICATIONS: ICD-10-CM

## 2022-08-31 DIAGNOSIS — R11.2 NAUSEA: ICD-10-CM

## 2022-08-31 DIAGNOSIS — K21.9 GASTROESOPHAGEAL REFLUX DISEASE WITHOUT ESOPHAGITIS: ICD-10-CM

## 2022-08-31 DIAGNOSIS — R94.5 ABNORMAL LFTS: ICD-10-CM

## 2022-08-31 DIAGNOSIS — Z09 FOLLOW UP: ICD-10-CM

## 2022-08-31 PROBLEM — 266435005: Status: ACTIVE | Noted: 2022-08-31

## 2022-08-31 PROBLEM — 442159003 CHRONIC ULCERATIVE PANCOLITIS: Status: ACTIVE | Noted: 2022-04-20

## 2022-08-31 PROCEDURE — 99214 OFFICE O/P EST MOD 30 MIN: CPT | Performed by: INTERNAL MEDICINE

## 2022-08-31 RX ORDER — PRENATAL VIT 49/IRON FUM/FOLIC 6.75-0.2MG
AS DIRECTED TABLET ORAL
Status: ACTIVE | COMMUNITY

## 2022-08-31 RX ORDER — SULFASALAZINE 500 MG/1
9 TABLET TABLET ORAL ONCE A DAY
Qty: 270 TABLET | Refills: 4 | OUTPATIENT

## 2022-08-31 RX ORDER — PANTOPRAZOLE SODIUM 40 MG/1
1 TABLET TABLET, DELAYED RELEASE ORAL ONCE A DAY
Qty: 30 TABLET | Refills: 11 | OUTPATIENT
Start: 2022-08-31

## 2022-08-31 RX ORDER — SULFASALAZINE 500 MG/1
9 TABLET TABLET ORAL ONCE A DAY
Qty: 270 TABLET | Refills: 4 | Status: ACTIVE | COMMUNITY
Start: 2022-04-20 | End: 2022-09-17

## 2022-08-31 RX ORDER — CHOLECALCIFEROL (VITAMIN D3) 50 MCG
1 TABLET TABLET ORAL ONCE A DAY
Status: ACTIVE | COMMUNITY

## 2022-08-31 NOTE — HPI-TODAY'S VISIT:
Ms Mackey is a 36 y/o individual with h/o left sided UC, currently on sulfasalazine (9 tab), here for follow-up of her condition. . Patient is referred by Dr. Jenifer Diaz. . She was diagnosed with UC in 3/2017 with sxs of consistent diarrhea.  She was started on antibiotics, which did not work.  Stool studies were negative.  She had a colonoscopy and was found to have mild colitis. . Previously on 11/1/2018, she reports that she has variable BM s.  She sees blood several times per week.  She reports having anywhere from 2-3 BM s per day, loose in nature.  No abdominal pain. No urgency. . A colonoscopy with Dr Tubbs on 11/2018 revealed Lundberg Score 2 disease in distal colon, confirmed on biopsies with active moderate disease and mild disease proximally. . Previously on 11/14/2018, she is noticing some nausea after she got to about 8 tablets of sulfasalazine. She has seen almost complete reduction in blood, still loose, but starting to get formed. Some abdominal pain, but perhaps jitters prior to her trip to [place]. . Previously on 2/8/2019, she reports that she feels that she is almost normal.  She has up to 3 BM, formed in nature, no blood in stool.  No abdominal pain.  Her trip to [facility] was good and did not get pregnant. . She had flex sig 5/2019 which revealed normal colonoscopy, Lundberg Score 0 to 1 in rectum, with biopsies confirming no active disease. . Previously on 6/20/2019, she has up to 2BM per day, no blood in the stool.  She has occasional abdominal pain/cramping.  Some fatigue.  Previously on 8/8/2019, she reports that is pregnant.  She has up to 2 BM per day, slightly loose and green in color, sometimes in pebble appearance.  Has started on MVI by gynecologist.  She has some fatigue.  Signif nausea. . Previously on 9/23/2019 she reports that she has 1 BM 2 times per week.  No blood in the stool.  No abdominal pain.  Pregnancy is going well.  Has a lot of nausea and vomiting.  . Previously on 10/23/2019, pt reports that she has been doing well.  N/V has resolved, nl BM.  Pregnancy is going well overall.  Has hemorrhoids that have become enlarged and swollen, no pain.  Having constipation, has not started miralax.  Saw blood in the stool. . Previously on 1/13/2020, pt reports that she is at 28 weeks of pregnancy.  Baby is doing well.  She has some issues with constipation every now and then, improves with Metamucil.  Nausea and vomiting has persists.  She was given Zofran by OB doctor.  She is taking her medications.  She is losing some weight and is recommended to take some ensure supplementation. . Previously on 3/2/2020, pt reports she had to deliver her baby 6 weeks early (32 weeks) due to poor growth of the baby.  She does report that the pregnancy was stressful for her and had a lot of nausea and heartburn and associated nausea and vomiting. She had poor weight gain as well.  Has 1 BM per day, no blood.  Does have some constipation. . Previously on 6/24/2020, pt reports that she is doing well.  She got the iron infusion, 2 infusion.  Was found to have abnl lft with PCP.  She has occasional spots of blood and occasional mucus.  She sees this initially when the dose of sulfa was reduced from 9 to 6.  . Previously on 8/24/2020, pt reports that her colonoscopy was completely normal (on 8/2020), with no active disease, with pathology normal. Previously on 4/20/2022, she is doing very well.  No issues with diarrhea, rectal bleeding, no abd pain.  Considering another baby this year. . Today on 8/31/2022, pt reports that she is doing well other than wheezing.  She started on the breathing medicine and has not seen improvement. . No NSAIDs. . No joint pain, no rashes.  Weight fluctuates.  No eye pain or eye sxs. . 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 Labs: OSH 6/2020: Ast 51, alt 61 1/2020: hgb 10.6, ast 14, alt 10 6/2019: Hgb 10.9, Ast 18, Alt 13, ESR 13 2/2019: Hgb 10.8, Ast 20, Alt 13, ESR 7 11/2018: Hgb: 12.7, Ast 15, Alt 10, Iron Sat 20, Ferritin 108, Vit D 18.3, Esr 44, B12 813; Crp 4.8, Hep B immune; TB-Gold neg  10/2018: Hgb 12.7, Ast 15, Alt 10, Iron Sat 20%, Ferritin 108, vit D 18.3, Esr 44, Crp 4.8 . Quant gold neg, Hep B immune . 3/2017:[facility] serology c/w UC . Colonoscopy 6/2022: The ascending colon appeared normal. Biopsies were taken with a cold forceps for histology. The pathology specimen was placed into Bottle Number 3. Findings: The transverse colon appeared normal. Biopsies were taken with a cold forceps for histology. The pathology specimen was placed into Bottle Number 4. The descending colon appeared normal. Biopsies were taken with a cold forceps for histology. The pathology specimen was placed into Bottle Number 5. The sigmoid colon appeared normal. Biopsies were taken with a cold forceps for histology. The pathology specimen was placed into Bottle Number 6. The rectum appeared normal. Biopsies were taken with a cold forceps for histology. The pathology specimen was placed into Bottle Number 7. Very small hemorrhoids were found on perianal exam. . Patchy mildly erythematous mucosa without active bleeding and with no stigmata of bleeding was found in the entire duodenum. Biopsies for histology were taken with a cold forceps for evaluation of celiac disease. The pathology specimen was placed into Bottle Number 1.  Mildly erythematous mucosa without bleeding was found in the entire examined stomach. Biopsies were taken with a cold forceps for histology. The pathology specimen was placed into Bottle Number 2. . Final Pathologic Diagnosis A Duodenum, Biopsy Gastric heterotopia. No histologic evidence of celiac disease or infectious microorganisms. B Stomach, Biopsy Reactive gastropathy. No Helicobacter pylori microorganisms are identified with a special stain. C Colon, Ascending, Biopsy Colonic mucosa with no significant histopathology. No histologic evidence of active, chronic or microscopic colitis. No granulomata or dysplasia seen. D Colon, Transverse, Biopsy Colonic mucosa with no significant histopathology. No histologic evidence of active, chronic or microscopic colitis. No granulomata or dysplasia seen. E Colon, Descending, Biopsy Colonic mucosa with no significant histopathology. No histologic evidence of active, chronic or microscopic colitis. No granulomata or dysplasia seen. F Colon, Sigmoid, Biopsy Colonic mucosa with no significant histopathology. No histologic evidence of active, chronic or microscopic colitis. No granulomata or dysplasia seen. G Rectum, Biopsy Colonic mucosa with no significant histopathology. No histologic evidence of active, chronic or microscopic colitis. No granulomata or dysplasia seen. . 6/2020: The transverse colon, hepatic flexure, ascending colon and cecum appeared normal. Biopsies were taken with a cold forceps for histology. The pathology specimen was placed into Bottle Number 1. Lundberg Score 0. Findings: A 4 mm polyp was found in the descending colon. The polyp was sessile. The polyp was removed with a jumbo cold forceps. Resection and retrieval were complete. The pathology specimen was placed into Bottle Number 3. Lundberg Score 0. The sigmoid colon and descending colon appeared normal. Biopsies were taken with a cold forceps for histology. The pathology specimen was placed into Bottle Number 2. Lundberg Score 0. A localized area of mildly erythematous mucosa was found in the rectum. This was biopsied with a cold forceps for histology. The pathology specimen was placed into Bottle Number 4. Lundberg Score 1. . A Colon, Transverse / Right, Biopsy Colonic mucosa with no significant histopathology. No histologic evidence of active, chronic or microscopic colitis. No granulomata or dysplasia seen. B Colon, Descending, Polypectomy Hyperplastic polyp. No granulomata or dysplasia seen. C Colon, Left, Biopsy Colonic mucosa with no significant histopathology. No histologic evidence of active, chronic or microscopic colitis. No granulomata or dysplasia seen. D Rectum, Biopsy Colonic mucosa with no significant histopathology. No histologic evidence of active, chronic or microscopic colitis. No granulomata or dysplasia seen. . 5/2019: The sigmoid colon and descending colon appeared normal.  Biopsies were taken with a cold forceps for histology.  The pathology specimen was placed into Bottle Number 1.  A diffuse area of mildly erythematous mucosa was found in the rectum.  This was biopsied with a cold forceps for histology. The pathology specimen was placed into Bottle Number 2. . A. Sigmoid Colon , Biopsy: -Colonic mucosa with no diagnostic abnormality.  -No evidence of chronic or active colitis.  -No granulomas seen.  -Negative for dysplasia or malignancy. B. Rectum , Biopsy: -Minimal architectural changes (see comment). -No evidence of active colitis. -No granulomas seen. -Negative for dysplasia or malignancy. . 11/2018 with Dr. Tubbs Inflammation was found in a continuous and circumferential pattern from the rectum to the sigmoid colon.  This was graded as Lundberg Score 2 (moderate, with marked erythema, absent vascular pattern, friability, erosions).  Biopsies were taken with a cold forceps for histology.  The pathology specimen was placed into Bottle Number 3. A localized area of mildly erythematous mucosa was found in the ascending colon.  The pathology specimen was placed into Bottle Number 1. . A. Right Colon , Biopsy: -Architectural and inflammatory changes consistent with primary inflammatory bowel disease with minimal activity. -No evidence of dysplasia or malignancy. B. Random Colon , Biopsy: -No diagnostic abnormality. -No evidence of chronic or active colitis, including lymphocytic and collagenous colitis. -No evidence of dysplasia or malignancy. C. Rectum , Biopsy: -Architectural and inflammatory changes consistent with primary inflammatory bowel disease with moderate activity. -No evidence of dysplasia or malignancy. . 2017: The terminal ileum appeared normal.  Biopsies were taken with a cold forceps for histology. A patchy area of moderately congested, erythematous, friable (with contact bleeding), granular, hemorrhagic, inflamed and ulcerated mucosa was found in the recto-sigmoid colon, in the sigmoid colon and in the cecum.  Biopsies were taken with a cold forceps for histology from cecum and rectosigmoid colon. The descending colon, transverse colon and ascending colon appeared normal.  Biopsies were taken with a cold forceps for histology and labeled right colon, left colon. . A. TERMINAL ILEUM (BIOPSY) ILEAL MUCOSA WITH NO SIGNIFICANT HISTOPATHOLOGIC CHANGES. Negative for ileitis. B. CECUM (BIOPSY) CHRONIC ACTIVE COLITIS. THESE CHANGES MAY BE SEEN WITH INFLAMMATORY BOWEL DISEASE OR A  PROLONGED SELF LIMITED COLITIS. Negative for granulomas and dysplasia. C. COLON (BIOPSY), RIGHT COLONIC MUCOSA WITH NO SIGNIFICANT HISTOPATHOLOGIC CHANGES. Negative for microscopic colitis. D. COLON (BIOPSY), LEFT COLONIC MUCOSA WITH NO SIGNIFICANT HISTOPATHOLOGIC CHANGES. Negative for microscopic colitis. E. RECTOSIGMOID (BIOPSY) CHRONIC ACTIVE COLITIS. THESE CHANGES MAY BE SEEN WITH INFLAMMATORY BOWEL DISEASE OR A  PROLONGED SELF LIMITED COLITIS. Negative for granulomas and dysplasia. .

## 2023-01-19 ENCOUNTER — WEB ENCOUNTER (OUTPATIENT)
Dept: URBAN - METROPOLITAN AREA CLINIC 96 | Facility: CLINIC | Age: 38
End: 2023-01-19

## 2023-01-19 RX ORDER — SULFASALAZINE 500 MG/1
9 TABLET TABLET ORAL ONCE A DAY
Qty: 270 TABLET | Refills: 11

## 2023-03-27 ENCOUNTER — TELEPHONE ENCOUNTER (OUTPATIENT)
Dept: URBAN - METROPOLITAN AREA CLINIC 96 | Facility: CLINIC | Age: 38
End: 2023-03-27

## 2023-03-27 RX ORDER — SULFASALAZINE 500 MG/1
9 TABLET TABLET ORAL ONCE A DAY
Qty: 270 TABLET | Refills: 11
End: 2024-03-21

## 2023-04-06 ENCOUNTER — TELEPHONE ENCOUNTER (OUTPATIENT)
Dept: URBAN - METROPOLITAN AREA CLINIC 96 | Facility: CLINIC | Age: 38
End: 2023-04-06

## 2023-08-01 ENCOUNTER — DASHBOARD ENCOUNTERS (OUTPATIENT)
Age: 38
End: 2023-08-01

## 2023-08-01 ENCOUNTER — OFFICE VISIT (OUTPATIENT)
Dept: URBAN - METROPOLITAN AREA CLINIC 96 | Facility: CLINIC | Age: 38
End: 2023-08-01
Payer: COMMERCIAL

## 2023-08-01 ENCOUNTER — LAB OUTSIDE AN ENCOUNTER (OUTPATIENT)
Dept: URBAN - METROPOLITAN AREA CLINIC 96 | Facility: CLINIC | Age: 38
End: 2023-08-01

## 2023-08-01 VITALS
TEMPERATURE: 97.2 F | SYSTOLIC BLOOD PRESSURE: 129 MMHG | HEART RATE: 90 BPM | DIASTOLIC BLOOD PRESSURE: 77 MMHG | WEIGHT: 231.4 LBS | BODY MASS INDEX: 39.5 KG/M2 | HEIGHT: 64 IN

## 2023-08-01 DIAGNOSIS — K51.00 ULCERATIVE COLITIS, UNIVERSAL, WITHOUT COMPLICATIONS: ICD-10-CM

## 2023-08-01 DIAGNOSIS — R11.2 NAUSEA: ICD-10-CM

## 2023-08-01 DIAGNOSIS — Z09 FOLLOW UP: ICD-10-CM

## 2023-08-01 DIAGNOSIS — Z91.89 COLON CANCER HIGH RISK: ICD-10-CM

## 2023-08-01 DIAGNOSIS — K21.9 GASTROESOPHAGEAL REFLUX DISEASE WITHOUT ESOPHAGITIS: ICD-10-CM

## 2023-08-01 DIAGNOSIS — K92.1 BLOOD IN STOOL: ICD-10-CM

## 2023-08-01 DIAGNOSIS — R94.5 ABNORMAL LFTS: ICD-10-CM

## 2023-08-01 PROCEDURE — 99214 OFFICE O/P EST MOD 30 MIN: CPT | Performed by: INTERNAL MEDICINE

## 2023-08-01 RX ORDER — CHOLECALCIFEROL (VITAMIN D3) 50 MCG
1 TABLET TABLET ORAL ONCE A DAY
Status: ACTIVE | COMMUNITY

## 2023-08-01 RX ORDER — PANTOPRAZOLE SODIUM 40 MG/1
1 TABLET TABLET, DELAYED RELEASE ORAL ONCE A DAY
Qty: 30 TABLET | Refills: 11 | Status: ACTIVE | COMMUNITY
Start: 2022-08-31

## 2023-08-01 RX ORDER — SULFASALAZINE 500 MG/1
9 TABLET TABLET ORAL ONCE A DAY
Qty: 810 TABLET | Refills: 4 | OUTPATIENT

## 2023-08-01 RX ORDER — SULFASALAZINE 500 MG/1
9 TABLET TABLET ORAL ONCE A DAY
Qty: 270 TABLET | Refills: 11 | Status: ACTIVE | COMMUNITY
End: 2024-03-21

## 2023-08-01 RX ORDER — PANTOPRAZOLE SODIUM 40 MG/1
1 TABLET TABLET, DELAYED RELEASE ORAL ONCE A DAY
Qty: 90 TABLET | Refills: 4 | OUTPATIENT

## 2023-08-01 RX ORDER — PRENATAL VIT 49/IRON FUM/FOLIC 6.75-0.2MG
AS DIRECTED TABLET ORAL
Status: ON HOLD | COMMUNITY

## 2023-08-01 NOTE — HPI-TODAY'S VISIT:
Ms Mackey is a 37 y/o individual with h/o left sided UC, currently on sulfasalazine (9 tab), here for follow-up of her condition. . Patient is referred by Dr. Jenifer Diaz. . She was diagnosed with UC in 3/2017 with sxs of consistent diarrhea.  She was started on antibiotics, which did not work.  Stool studies were negative.  She had a colonoscopy and was found to have mild colitis. . Previously on 11/1/2018, she reports that she has variable BM s.  She sees blood several times per week.  She reports having anywhere from 2-3 BM s per day, loose in nature.  No abdominal pain. No urgency. . A colonoscopy with Dr Tubbs on 11/2018 revealed Lundberg Score 2 disease in distal colon, confirmed on biopsies with active moderate disease and mild disease proximally. . Previously on 11/14/2018, she is noticing some nausea after she got to about 8 tablets of sulfasalazine. She has seen almost complete reduction in blood, still loose, but starting to get formed. Some abdominal pain, but perhaps jitters prior to her trip to [place]. . Previously on 2/8/2019, she reports that she feels that she is almost normal.  She has up to 3 BM, formed in nature, no blood in stool.  No abdominal pain.  Her trip to [facility] was good and did not get pregnant. . She had flex sig 5/2019 which revealed normal colonoscopy, Lundberg Score 0 to 1 in rectum, with biopsies confirming no active disease. . Previously on 6/20/2019, she has up to 2BM per day, no blood in the stool.  She has occasional abdominal pain/cramping.  Some fatigue.  Previously on 8/8/2019, she reports that is pregnant.  She has up to 2 BM per day, slightly loose and green in color, sometimes in pebble appearance.  Has started on MVI by gynecologist.  She has some fatigue.  Signif nausea. . Previously on 9/23/2019 she reports that she has 1 BM 2 times per week.  No blood in the stool.  No abdominal pain.  Pregnancy is going well.  Has a lot of nausea and vomiting.  . Previously on 10/23/2019, pt reports that she has been doing well.  N/V has resolved, nl BM.  Pregnancy is going well overall.  Has hemorrhoids that have become enlarged and swollen, no pain.  Having constipation, has not started miralax.  Saw blood in the stool. . Previously on 1/13/2020, pt reports that she is at 28 weeks of pregnancy.  Baby is doing well.  She has some issues with constipation every now and then, improves with Metamucil.  Nausea and vomiting has persists.  She was given Zofran by OB doctor.  She is taking her medications.  She is losing some weight and is recommended to take some ensure supplementation. . Previously on 3/2/2020, pt reports she had to deliver her baby 6 weeks early (32 weeks) due to poor growth of the baby.  She does report that the pregnancy was stressful for her and had a lot of nausea and heartburn and associated nausea and vomiting. She had poor weight gain as well.  Has 1 BM per day, no blood.  Does have some constipation. . Previously on 6/24/2020, pt reports that she is doing well.  She got the iron infusion, 2 infusion.  Was found to have abnl lft with PCP.  She has occasional spots of blood and occasional mucus.  She sees this initially when the dose of sulfa was reduced from 9 to 6.  . Previously on 8/24/2020, pt reports that her colonoscopy was completely normal (on 8/2020), with no active disease, with pathology normal. Previously on 4/20/2022, she is doing very well.  No issues with diarrhea, rectal bleeding, no abd pain.  Considering another baby this year. . Previously on 8/31/2022, pt reports that she is doing well other than wheezing.  She started on the breathing medicine and has not seen improvement. . Today on 8/1/2023, pt reports that she notices a gurgling sensation in her stomach.  No burning or GERD sxs. No dysphagia.  No diarrhea.  Sxs do not have any association with eating or laying down. . No NSAIDs. . No joint pain, no rashes.  Weight fluctuates.  No eye pain or eye sxs. . 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 Labs: OSH 6/2020: Ast 51, alt 61 1/2020: hgb 10.6, ast 14, alt 10 6/2019: Hgb 10.9, Ast 18, Alt 13, ESR 13 2/2019: Hgb 10.8, Ast 20, Alt 13, ESR 7 11/2018: Hgb: 12.7, Ast 15, Alt 10, Iron Sat 20, Ferritin 108, Vit D 18.3, Esr 44, B12 813; Crp 4.8, Hep B immune; TB-Gold neg  10/2018: Hgb 12.7, Ast 15, Alt 10, Iron Sat 20%, Ferritin 108, vit D 18.3, Esr 44, Crp 4.8 . Quant gold neg, Hep B immune . 3/2017:[facility] serology c/w UC . Colonoscopy 6/2022: The ascending colon appeared normal. Biopsies were taken with a cold forceps for histology. The pathology specimen was placed into Bottle Number 3. Findings: The transverse colon appeared normal. Biopsies were taken with a cold forceps for histology. The pathology specimen was placed into Bottle Number 4. The descending colon appeared normal. Biopsies were taken with a cold forceps for histology. The pathology specimen was placed into Bottle Number 5. The sigmoid colon appeared normal. Biopsies were taken with a cold forceps for histology. The pathology specimen was placed into Bottle Number 6. The rectum appeared normal. Biopsies were taken with a cold forceps for histology. The pathology specimen was placed into Bottle Number 7. Very small hemorrhoids were found on perianal exam. . Patchy mildly erythematous mucosa without active bleeding and with no stigmata of bleeding was found in the entire duodenum. Biopsies for histology were taken with a cold forceps for evaluation of celiac disease. The pathology specimen was placed into Bottle Number 1.  Mildly erythematous mucosa without bleeding was found in the entire examined stomach. Biopsies were taken with a cold forceps for histology. The pathology specimen was placed into Bottle Number 2. . Final Pathologic Diagnosis A Duodenum, Biopsy Gastric heterotopia. No histologic evidence of celiac disease or infectious microorganisms. B Stomach, Biopsy Reactive gastropathy. No Helicobacter pylori microorganisms are identified with a special stain. C Colon, Ascending, Biopsy Colonic mucosa with no significant histopathology. No histologic evidence of active, chronic or microscopic colitis. No granulomata or dysplasia seen. D Colon, Transverse, Biopsy Colonic mucosa with no significant histopathology. No histologic evidence of active, chronic or microscopic colitis. No granulomata or dysplasia seen. E Colon, Descending, Biopsy Colonic mucosa with no significant histopathology. No histologic evidence of active, chronic or microscopic colitis. No granulomata or dysplasia seen. F Colon, Sigmoid, Biopsy Colonic mucosa with no significant histopathology. No histologic evidence of active, chronic or microscopic colitis. No granulomata or dysplasia seen. G Rectum, Biopsy Colonic mucosa with no significant histopathology. No histologic evidence of active, chronic or microscopic colitis. No granulomata or dysplasia seen. . 6/2020: The transverse colon, hepatic flexure, ascending colon and cecum appeared normal. Biopsies were taken with a cold forceps for histology. The pathology specimen was placed into Bottle Number 1. Lundberg Score 0. Findings: A 4 mm polyp was found in the descending colon. The polyp was sessile. The polyp was removed with a jumbo cold forceps. Resection and retrieval were complete. The pathology specimen was placed into Bottle Number 3. Lundberg Score 0. The sigmoid colon and descending colon appeared normal. Biopsies were taken with a cold forceps for histology. The pathology specimen was placed into Bottle Number 2. Lundberg Score 0. A localized area of mildly erythematous mucosa was found in the rectum. This was biopsied with a cold forceps for histology. The pathology specimen was placed into Bottle Number 4. Lundberg Score 1. . A Colon, Transverse / Right, Biopsy Colonic mucosa with no significant histopathology. No histologic evidence of active, chronic or microscopic colitis. No granulomata or dysplasia seen. B Colon, Descending, Polypectomy Hyperplastic polyp. No granulomata or dysplasia seen. C Colon, Left, Biopsy Colonic mucosa with no significant histopathology. No histologic evidence of active, chronic or microscopic colitis. No granulomata or dysplasia seen. D Rectum, Biopsy Colonic mucosa with no significant histopathology. No histologic evidence of active, chronic or microscopic colitis. No granulomata or dysplasia seen. . 5/2019: The sigmoid colon and descending colon appeared normal.  Biopsies were taken with a cold forceps for histology.  The pathology specimen was placed into Bottle Number 1.  A diffuse area of mildly erythematous mucosa was found in the rectum.  This was biopsied with a cold forceps for histology. The pathology specimen was placed into Bottle Number 2. . A. Sigmoid Colon , Biopsy: -Colonic mucosa with no diagnostic abnormality.  -No evidence of chronic or active colitis.  -No granulomas seen.  -Negative for dysplasia or malignancy. B. Rectum , Biopsy: -Minimal architectural changes (see comment). -No evidence of active colitis. -No granulomas seen. -Negative for dysplasia or malignancy. . 11/2018 with Dr. Tubbs Inflammation was found in a continuous and circumferential pattern from the rectum to the sigmoid colon.  This was graded as Lundberg Score 2 (moderate, with marked erythema, absent vascular pattern, friability, erosions).  Biopsies were taken with a cold forceps for histology.  The pathology specimen was placed into Bottle Number 3. A localized area of mildly erythematous mucosa was found in the ascending colon.  The pathology specimen was placed into Bottle Number 1. . A. Right Colon , Biopsy: -Architectural and inflammatory changes consistent with primary inflammatory bowel disease with minimal activity. -No evidence of dysplasia or malignancy. B. Random Colon , Biopsy: -No diagnostic abnormality. -No evidence of chronic or active colitis, including lymphocytic and collagenous colitis. -No evidence of dysplasia or malignancy. C. Rectum, Biopsy: -Architectural and inflammatory changes consistent with primary inflammatory bowel disease with moderate activity. -No evidence of dysplasia or malignancy. . 2017: The terminal ileum appeared normal.  Biopsies were taken with a cold forceps for histology. A patchy area of moderately congested, erythematous, friable (with contact bleeding), granular, hemorrhagic, inflamed and ulcerated mucosa was found in the recto-sigmoid colon, in the sigmoid colon and in the cecum.  Biopsies were taken with a cold forceps for histology from cecum and rectosigmoid colon. The descending colon, transverse colon and ascending colon appeared normal.  Biopsies were taken with a cold forceps for histology and labeled right colon, left colon. . A. TERMINAL ILEUM (BIOPSY) ILEAL MUCOSA WITH NO SIGNIFICANT HISTOPATHOLOGIC CHANGES. Negative for ileitis. B. CECUM (BIOPSY) CHRONIC ACTIVE COLITIS. THESE CHANGES MAY BE SEEN WITH INFLAMMATORY BOWEL DISEASE OR A  PROLONGED SELF LIMITED COLITIS. Negative for granulomas and dysplasia. C. COLON (BIOPSY), RIGHT COLONIC MUCOSA WITH NO SIGNIFICANT HISTOPATHOLOGIC CHANGES. Negative for microscopic colitis. D. COLON (BIOPSY), LEFT COLONIC MUCOSA WITH NO SIGNIFICANT HISTOPATHOLOGIC CHANGES. Negative for microscopic colitis. E. RECTOSIGMOID (BIOPSY) CHRONIC ACTIVE COLITIS. THESE CHANGES MAY BE SEEN WITH INFLAMMATORY BOWEL DISEASE OR A  PROLONGED SELF LIMITED COLITIS. Negative for granulomas and dysplasia. .

## 2024-04-12 ENCOUNTER — COLON (OUTPATIENT)
Dept: URBAN - METROPOLITAN AREA SURGERY CENTER 18 | Facility: SURGERY CENTER | Age: 39
End: 2024-04-12
Payer: COMMERCIAL

## 2024-04-12 DIAGNOSIS — K51.50 ACUTE LEFT-SIDED ULCERATIVE COLITIS: ICD-10-CM

## 2024-04-12 DIAGNOSIS — K63.5 BENIGN COLON POLYP: ICD-10-CM

## 2024-04-12 PROCEDURE — G8907 PT DOC NO EVENTS ON DISCHARG: HCPCS | Performed by: INTERNAL MEDICINE

## 2024-04-12 PROCEDURE — 45380 COLONOSCOPY AND BIOPSY: CPT | Performed by: INTERNAL MEDICINE

## 2024-04-12 RX ORDER — SULFASALAZINE 500 MG/1
9 TABLET TABLET ORAL ONCE A DAY
Qty: 810 TABLET | Refills: 4 | Status: ACTIVE | COMMUNITY

## 2024-04-12 RX ORDER — PRENATAL VIT 49/IRON FUM/FOLIC 6.75-0.2MG
AS DIRECTED TABLET ORAL
Status: ON HOLD | COMMUNITY

## 2024-04-12 RX ORDER — PANTOPRAZOLE SODIUM 40 MG/1
1 TABLET TABLET, DELAYED RELEASE ORAL ONCE A DAY
Qty: 90 TABLET | Refills: 4 | Status: ACTIVE | COMMUNITY

## 2024-04-12 RX ORDER — CHOLECALCIFEROL (VITAMIN D3) 50 MCG
1 TABLET TABLET ORAL ONCE A DAY
Status: ACTIVE | COMMUNITY

## 2024-07-03 ENCOUNTER — OFFICE VISIT (OUTPATIENT)
Dept: URBAN - METROPOLITAN AREA CLINIC 96 | Facility: CLINIC | Age: 39
End: 2024-07-03

## 2024-08-02 ENCOUNTER — TELEPHONE ENCOUNTER (OUTPATIENT)
Dept: URBAN - METROPOLITAN AREA CLINIC 96 | Facility: CLINIC | Age: 39
End: 2024-08-02

## 2024-08-02 RX ORDER — PANTOPRAZOLE SODIUM 40 MG/1
1 TABLET TABLET, DELAYED RELEASE ORAL ONCE A DAY
Qty: 90 TABLET | Refills: 4

## 2024-08-02 RX ORDER — SULFASALAZINE 500 MG/1
9 TABLET TABLET ORAL ONCE A DAY
Qty: 810 TABLET | Refills: 4
End: 2025-10-26

## 2025-08-06 ENCOUNTER — OFFICE VISIT (OUTPATIENT)
Dept: URBAN - METROPOLITAN AREA CLINIC 96 | Facility: CLINIC | Age: 40
End: 2025-08-06
Payer: COMMERCIAL

## 2025-08-06 ENCOUNTER — LAB OUTSIDE AN ENCOUNTER (OUTPATIENT)
Dept: URBAN - METROPOLITAN AREA CLINIC 96 | Facility: CLINIC | Age: 40
End: 2025-08-06

## 2025-08-06 DIAGNOSIS — R94.5 ABNORMAL LFTS: ICD-10-CM

## 2025-08-06 DIAGNOSIS — K92.1 BLOOD IN STOOL: ICD-10-CM

## 2025-08-06 DIAGNOSIS — K21.9 GASTROESOPHAGEAL REFLUX DISEASE WITHOUT ESOPHAGITIS: ICD-10-CM

## 2025-08-06 DIAGNOSIS — Z91.89 COLON CANCER HIGH RISK: ICD-10-CM

## 2025-08-06 DIAGNOSIS — K51.00 ULCERATIVE COLITIS, UNIVERSAL, WITHOUT COMPLICATIONS: ICD-10-CM

## 2025-08-06 PROCEDURE — 99215 OFFICE O/P EST HI 40 MIN: CPT | Performed by: INTERNAL MEDICINE

## 2025-08-06 RX ORDER — PANTOPRAZOLE SODIUM 40 MG/1
1 TABLET TABLET, DELAYED RELEASE ORAL ONCE A DAY
Qty: 90 TABLET | Refills: 4 | OUTPATIENT
Start: 2025-08-06

## 2025-08-06 RX ORDER — PANTOPRAZOLE SODIUM 40 MG/1
1 TABLET TABLET, DELAYED RELEASE ORAL ONCE A DAY
Qty: 90 TABLET | Refills: 4 | Status: ACTIVE | COMMUNITY

## 2025-08-06 RX ORDER — CHOLECALCIFEROL (VITAMIN D3) 50 MCG
1 TABLET TABLET ORAL ONCE A DAY
Status: ACTIVE | COMMUNITY

## 2025-08-06 RX ORDER — SULFASALAZINE 500 MG/1
9 TABLET TABLET ORAL ONCE A DAY
Qty: 810 TABLET | Refills: 4 | OUTPATIENT
Start: 2025-08-06 | End: 2026-10-30

## 2025-08-06 RX ORDER — PRENATAL VIT 49/IRON FUM/FOLIC 6.75-0.2MG
AS DIRECTED TABLET ORAL
Status: ON HOLD | COMMUNITY

## 2025-08-06 RX ORDER — SULFASALAZINE 500 MG/1
9 TABLET TABLET ORAL ONCE A DAY
Qty: 810 TABLET | Refills: 4 | Status: ACTIVE | COMMUNITY
End: 2025-10-26

## 2025-08-06 RX ORDER — METRONIDAZOLE 500 MG/1
1 TABLET TABLET, FILM COATED ORAL
Qty: 28 TABLET | Refills: 0 | OUTPATIENT
Start: 2025-08-06 | End: 2025-08-20